# Patient Record
(demographics unavailable — no encounter records)

---

## 2024-12-02 NOTE — PHYSICAL EXAM
[Appropriately responsive] : appropriately responsive [Alert] : alert [No Acute Distress] : no acute distress [Soft] : soft [Non-tender] : non-tender [No Lesions] : no lesions [No Mass] : no mass [Oriented x3] : oriented x3 [FreeTextEntry2] : Ambulatory with stead gait w/o assistance [FreeTextEntry5] : respirations even, unlabored. no dyspnea  [FreeTextEntry8] : remote and short term memory intact [FreeTextEntry1] : Labia/Clitoris: Normal atrophy, no lesions. Vagina: atrophic throughout, + cystocele;  no lesions visible or palpable. no abnormal discharge, vaginal cuff seen Cervix: not seen (surgically absent) Uterus: surgically absent  Adnexa: ovaries are surgically absent bilaterally; No palpable adnexal masses, nontender bilaterally.

## 2024-12-02 NOTE — REVIEW OF SYSTEMS
[Negative] : Heme/Lymph [Frequency] : frequency [Incontinence] : no incontinence [Dysuria] : no dysuria [Abn Vaginal bleeding] : no abnormal vaginal bleeding [Pelvic pain] : no pelvic pain

## 2024-12-02 NOTE — DISCUSSION/SUMMARY
[FreeTextEntry1] : Well appearing geriatric female is here for pessary cleaning. She is happy with current pessary management, but reports increase in nocturia over the past 2 months, gets up 3-4 x per night. She denies any other acute urinary symptoms. She admits to drinking two cups of coffee per day, but usually drinks the second cup by 10:30 am. She denies drinking water or excess fluids prior to bed.   We discussed decreasing caffeine intake, bladder hygiene and UTI precautions. Will send urine culture to check for infection and treat based on results.   Pessary was cleaned and reinserted, patient tolerated the procedure well.  PMB precautions and Urinary /UTI warnings given.  RTO in 3 months for pessary cleaning and as needed.

## 2024-12-02 NOTE — COUNSELING
[Bladder Hygiene] : bladder hygiene [Nutrition/ Exercise/ Weight Management] : nutrition, exercise, weight management [Vitamins/Supplements] : vitamins/supplements [FreeTextEntry2] : Pessary care

## 2024-12-02 NOTE — HISTORY OF PRESENT ILLNESS
[postmenopausal] : postmenopausal [Y] : Patient is sexually active [Monogamous (Male Partner)] : is monogamous with a male partner [TextBox_19] : within last 5 years [TextBox_31] : no hx of abnormal PAP's [LMPDate] :  [PGHxTotal] : 3 [[facility] 3 [facility] [FreeTextEntry1] : NVD x3

## 2025-04-03 NOTE — DISCUSSION/SUMMARY
[FreeTextEntry1] : Well appearing geriatric female is here for pessary cleaning. She is happy with current pessary management.   Pessary was cleaned and reinserted, patient tolerated the procedure well.  PMB precautions and Urinary /UTI warnings given.  RTO in 3 months for pessary cleaning and as needed.

## 2025-04-03 NOTE — HISTORY OF PRESENT ILLNESS
[postmenopausal] : postmenopausal [Y] : Patient is sexually active [Monogamous (Male Partner)] : is monogamous with a male partner [TextBox_19] : within last 5 years [TextBox_31] : no hx of abnormal PAP's [LMPDate] :  [PGHxTotal] : 3 [[facility]] : 3 [[facility] : 3 [FreeTextEntry1] : NVD x3

## 2025-04-03 NOTE — PHYSICAL EXAM
[Appropriately responsive] : appropriately responsive [Alert] : alert [No Acute Distress] : no acute distress [Soft] : soft [Non-tender] : non-tender [No Lesions] : no lesions [No Mass] : no mass [Oriented x3] : oriented x3 [FreeTextEntry2] : Ambulatory with stead gait w/o assistance [FreeTextEntry5] : respirations even, unlabored. no dyspnea  [FreeTextEntry8] : remote and short term memory intact [FreeTextEntry1] : Labia/Clitoris: Normal atrophy, no lesions. Vagina: atrophic throughout, + cystocele; small area of erythema on left upper wall, not friable, no ulcerations, nontender. no abnormal discharge, vaginal cuff seen Cervix: not seen (surgically absent) Uterus: surgically absent  Adnexa: ovaries are surgically absent bilaterally; No palpable adnexal masses, nontender bilaterally.

## 2025-06-26 NOTE — HISTORY OF PRESENT ILLNESS
Noted. Agree -- try allegra. Advise saline rinse.   UPdate on Friday [postmenopausal] : postmenopausal [Y] : Patient is sexually active [Monogamous (Male Partner)] : is monogamous with a male partner [TextBox_19] : within last 5 years [TextBox_31] : no hx of abnormal PAP's [LMPDate] :  [PGHxTotal] : 3 [[facility]] : 3 [[facility] 3 [FreeTextEntry1] : NVD x3

## 2025-06-26 NOTE — PHYSICAL EXAM
[Appropriately responsive] : appropriately responsive [Alert] : alert [No Acute Distress] : no acute distress [Soft] : soft [Non-tender] : non-tender [No Lesions] : no lesions [No Mass] : no mass [Oriented x3] : oriented x3 [FreeTextEntry2] : Ambulatory with stead gait w/o assistance [FreeTextEntry5] : respirations even, unlabored. no dyspnea  [FreeTextEntry8] : remote and short term memory intact [FreeTextEntry1] : Labia/Clitoris: Normal atrophy, no lesions. Vagina: atrophic throughout, + cystocele; pale pink tissue, no lesions visible or palpable, no abnormal discharge, vaginal cuff seen Cervix: not seen (surgically absent) Uterus: surgically absent Adnexa: ovaries are surgically absent bilaterally; No palpable adnexal masses, nontender bilaterally.